# Patient Record
Sex: MALE | Race: WHITE | NOT HISPANIC OR LATINO
[De-identification: names, ages, dates, MRNs, and addresses within clinical notes are randomized per-mention and may not be internally consistent; named-entity substitution may affect disease eponyms.]

---

## 2018-03-28 ENCOUNTER — APPOINTMENT (OUTPATIENT)
Dept: CARDIOLOGY | Facility: CLINIC | Age: 28
End: 2018-03-28
Payer: COMMERCIAL

## 2018-03-28 ENCOUNTER — NON-APPOINTMENT (OUTPATIENT)
Age: 28
End: 2018-03-28

## 2018-03-28 VITALS
WEIGHT: 245 LBS | HEART RATE: 68 BPM | SYSTOLIC BLOOD PRESSURE: 120 MMHG | DIASTOLIC BLOOD PRESSURE: 86 MMHG | OXYGEN SATURATION: 100 % | HEIGHT: 74 IN | BODY MASS INDEX: 31.44 KG/M2

## 2018-03-28 DIAGNOSIS — Z86.39 PERSONAL HISTORY OF OTHER ENDOCRINE, NUTRITIONAL AND METABOLIC DISEASE: ICD-10-CM

## 2018-03-28 DIAGNOSIS — F17.290 NICOTINE DEPENDENCE, OTHER TOBACCO PRODUCT, UNCOMPLICATED: ICD-10-CM

## 2018-03-28 DIAGNOSIS — Z78.9 OTHER SPECIFIED HEALTH STATUS: ICD-10-CM

## 2018-03-28 PROCEDURE — 99244 OFF/OP CNSLTJ NEW/EST MOD 40: CPT

## 2018-03-28 PROCEDURE — 93000 ELECTROCARDIOGRAM COMPLETE: CPT

## 2018-05-14 ENCOUNTER — APPOINTMENT (OUTPATIENT)
Dept: CARDIOLOGY | Facility: CLINIC | Age: 28
End: 2018-05-14
Payer: COMMERCIAL

## 2018-05-14 PROCEDURE — 93306 TTE W/DOPPLER COMPLETE: CPT

## 2018-05-14 PROCEDURE — 93015 CV STRESS TEST SUPVJ I&R: CPT

## 2018-06-01 ENCOUNTER — APPOINTMENT (OUTPATIENT)
Dept: CARDIOLOGY | Facility: CLINIC | Age: 28
End: 2018-06-01
Payer: COMMERCIAL

## 2018-06-01 VITALS
SYSTOLIC BLOOD PRESSURE: 138 MMHG | DIASTOLIC BLOOD PRESSURE: 90 MMHG | BODY MASS INDEX: 29.77 KG/M2 | HEIGHT: 74 IN | WEIGHT: 232 LBS | HEART RATE: 78 BPM

## 2018-06-01 VITALS — DIASTOLIC BLOOD PRESSURE: 75 MMHG | SYSTOLIC BLOOD PRESSURE: 116 MMHG

## 2018-06-01 PROCEDURE — 99214 OFFICE O/P EST MOD 30 MIN: CPT

## 2018-10-09 ENCOUNTER — APPOINTMENT (OUTPATIENT)
Dept: CARDIOLOGY | Facility: CLINIC | Age: 28
End: 2018-10-09
Payer: COMMERCIAL

## 2018-10-09 VITALS
SYSTOLIC BLOOD PRESSURE: 120 MMHG | BODY MASS INDEX: 31.81 KG/M2 | OXYGEN SATURATION: 98 % | HEART RATE: 74 BPM | DIASTOLIC BLOOD PRESSURE: 84 MMHG | HEIGHT: 73 IN | WEIGHT: 240 LBS

## 2018-10-09 PROCEDURE — 99213 OFFICE O/P EST LOW 20 MIN: CPT

## 2019-10-08 ENCOUNTER — APPOINTMENT (OUTPATIENT)
Dept: CARDIOLOGY | Facility: CLINIC | Age: 29
End: 2019-10-08
Payer: COMMERCIAL

## 2019-10-08 ENCOUNTER — NON-APPOINTMENT (OUTPATIENT)
Age: 29
End: 2019-10-08

## 2019-10-08 VITALS
BODY MASS INDEX: 32.34 KG/M2 | WEIGHT: 244 LBS | DIASTOLIC BLOOD PRESSURE: 80 MMHG | OXYGEN SATURATION: 99 % | HEIGHT: 73 IN | SYSTOLIC BLOOD PRESSURE: 122 MMHG | HEART RATE: 72 BPM

## 2019-10-08 PROCEDURE — 99213 OFFICE O/P EST LOW 20 MIN: CPT

## 2019-10-08 PROCEDURE — 93000 ELECTROCARDIOGRAM COMPLETE: CPT

## 2019-10-08 NOTE — REVIEW OF SYSTEMS
[Recent Weight Loss (___ Lbs)] : recent [unfilled] ~Ulb weight loss [see HPI] : see HPI [Joint Pain] : joint pain [Negative] : Heme/Lymph

## 2019-10-08 NOTE — ASSESSMENT
[FreeTextEntry1] : Family history of sudden cardiac death. \par Family history of aortic dilatation. \par Family history of left ventricular hypertrophy. \par Family history of atherosclerosis.\par Overweight\par There is no evidence of significant ischemic heart disease, valvular disease or LV dysfunction\par Prior blood work notable for dyslipidemia with low HDL and elevated fasting glucose\par Coronary calcium score results reviewed. \par Increase aerobic exercise program.\par Weight loss for long-term cardiovascular health.\par \par Unclear if sudden cardiac death related to moderate atherosclerosis\par Will arrange for electrophysiology evaluation, ? genetic screening for SCD\par \par Blood work has been requested. Increase aerobic exercise program. \par

## 2019-10-08 NOTE — PHYSICAL EXAM
[General Appearance - Well Developed] : well developed [Normal Appearance] : normal appearance [Well Groomed] : well groomed [General Appearance - Well Nourished] : well nourished [No Deformities] : no deformities [General Appearance - In No Acute Distress] : no acute distress [Normal Conjunctiva] : the conjunctiva exhibited no abnormalities [Eyelids - No Xanthelasma] : the eyelids demonstrated no xanthelasmas [Normal Oral Mucosa] : normal oral mucosa [No Oral Pallor] : no oral pallor [No Oral Cyanosis] : no oral cyanosis [Normal Jugular Venous A Waves Present] : normal jugular venous A waves present [Normal Jugular Venous V Waves Present] : normal jugular venous V waves present [No Jugular Venous Raygoza A Waves] : no jugular venous raygoza A waves [Respiration, Rhythm And Depth] : normal respiratory rhythm and effort [Exaggerated Use Of Accessory Muscles For Inspiration] : no accessory muscle use [Heart Rate And Rhythm] : heart rate and rhythm were normal [Auscultation Breath Sounds / Voice Sounds] : lungs were clear to auscultation bilaterally [Heart Sounds] : normal S1 and S2 [Murmurs] : no murmurs present [Abdomen Soft] : soft [Abdomen Tenderness] : non-tender [Abdomen Mass (___ Cm)] : no abdominal mass palpated [Abnormal Walk] : normal gait [Gait - Sufficient For Exercise Testing] : the gait was sufficient for exercise testing [Nail Clubbing] : no clubbing of the fingernails [Cyanosis, Localized] : no localized cyanosis [Petechial Hemorrhages (___cm)] : no petechial hemorrhages [Skin Color & Pigmentation] : normal skin color and pigmentation [] : no rash [No Venous Stasis] : no venous stasis [Skin Lesions] : no skin lesions [No Xanthoma] : no  xanthoma was observed [No Skin Ulcers] : no skin ulcer [Oriented To Time, Place, And Person] : oriented to person, place, and time [Affect] : the affect was normal [Mood] : the mood was normal [No Anxiety] : not feeling anxious [FreeTextEntry1] : OVERWEIGHT

## 2019-10-08 NOTE — HISTORY OF PRESENT ILLNESS
[FreeTextEntry1] : MICHELE DO  is a 29 year old  M\par Referred by primary physician. \par +Fhx, DL, overweight\par \par Significant family history of cardiovascular disease. Father  at age of 50 of sudden cardiac death. Grandfather  at similar age no autopsy was performed. Recently father sister  suddenly in the 50s.\par \par No prior cardiovascular evaluation. Baseline physically active. Works at the [employer], swims, work etc. Active with [employer].\par \par There is no prior history of a clinical myocardial infarction, coronary revascularization. \par There is no history of symptomatic congestive heart failure rheumatic heart disease or valvular disease.\par There is no history of symptomatic arrhythmias including atrial fibrillation.\par \par Not engage in any regular aerobic exercise program. \par There is no exertional chest pain, pressure or discomfort. \par There is no significant dyspnea on exertion or orthopnea. \par There are no symptomatic palpitations, lightheadedness, dizziness or syncope.\par \par Electrophysiology visit was rescheduled.\par He is reluctant to take medication. Previously was on Crestor therapy.\par \par EKG today demonstrates normal sinus rhythm. \par Autopsy report reviewed from . Autopsy report of father Michele Do describes hypertensive and arteriosclerotic cardiovascular disease. Moderate atherosclerosis of coronary arteries, slight atherosclerosis of aorta, hypertrophy of heart, aneurysmal dilatation of the ascending aorta, maximal lumen medial narrowing of coronary artery reported as LAD 50%. Right coronary artery 60%. \par \par Autopsy report of aunt Patricia Do [place] 2017. Coronary arteries with moderate atherosclerosis.\par \par Echocardiogram. May 2018. Normal left ventricular function. Minimal valvular disease. \par Exercise tolerance test. May 2018. Completed stage IV of Brandyn protocol. Peak heart rate 169 88% at peak. No chest pain. Normal hemodynamic response. No exercise-induced ischemia by EKG. \par Blood work May 2018. Hemoglobin 14.8, potassium 4.6, creatinine 1.0, HDL 34, , A1c 5.3, C-reactive protein within normal limits. .\par Prior coronary artery calcium score is zero.\par \par

## 2019-12-31 ENCOUNTER — OUTPATIENT (OUTPATIENT)
Dept: OUTPATIENT SERVICES | Facility: HOSPITAL | Age: 29
LOS: 1 days | End: 2019-12-31
Payer: COMMERCIAL

## 2019-12-31 ENCOUNTER — APPOINTMENT (OUTPATIENT)
Dept: MRI IMAGING | Facility: CLINIC | Age: 29
End: 2019-12-31
Payer: COMMERCIAL

## 2019-12-31 DIAGNOSIS — R94.31 ABNORMAL ELECTROCARDIOGRAM [ECG] [EKG]: ICD-10-CM

## 2019-12-31 DIAGNOSIS — Z82.41 FAMILY HISTORY OF SUDDEN CARDIAC DEATH: ICD-10-CM

## 2019-12-31 PROCEDURE — A9585: CPT

## 2019-12-31 PROCEDURE — 75561 CARDIAC MRI FOR MORPH W/DYE: CPT

## 2019-12-31 PROCEDURE — 75561 CARDIAC MRI FOR MORPH W/DYE: CPT | Mod: 26

## 2020-10-05 ENCOUNTER — APPOINTMENT (OUTPATIENT)
Dept: CARDIOLOGY | Facility: CLINIC | Age: 30
End: 2020-10-05
Payer: COMMERCIAL

## 2020-10-23 ENCOUNTER — TRANSCRIPTION ENCOUNTER (OUTPATIENT)
Age: 30
End: 2020-10-23

## 2020-11-16 ENCOUNTER — APPOINTMENT (OUTPATIENT)
Dept: CARDIOLOGY | Facility: CLINIC | Age: 30
End: 2020-11-16
Payer: COMMERCIAL

## 2020-11-16 ENCOUNTER — NON-APPOINTMENT (OUTPATIENT)
Age: 30
End: 2020-11-16

## 2020-11-16 VITALS
SYSTOLIC BLOOD PRESSURE: 138 MMHG | DIASTOLIC BLOOD PRESSURE: 84 MMHG | HEART RATE: 77 BPM | OXYGEN SATURATION: 98 % | BODY MASS INDEX: 32.07 KG/M2 | WEIGHT: 242 LBS | HEIGHT: 73 IN

## 2020-11-16 PROCEDURE — 99213 OFFICE O/P EST LOW 20 MIN: CPT

## 2020-11-16 PROCEDURE — 93000 ELECTROCARDIOGRAM COMPLETE: CPT

## 2020-11-28 NOTE — PHYSICAL EXAM
[General Appearance - Well Developed] : well developed [Normal Appearance] : normal appearance [Well Groomed] : well groomed [General Appearance - Well Nourished] : well nourished [No Deformities] : no deformities [General Appearance - In No Acute Distress] : no acute distress [Normal Conjunctiva] : the conjunctiva exhibited no abnormalities [Eyelids - No Xanthelasma] : the eyelids demonstrated no xanthelasmas [Normal Oral Mucosa] : normal oral mucosa [No Oral Pallor] : no oral pallor [No Oral Cyanosis] : no oral cyanosis [Normal Jugular Venous A Waves Present] : normal jugular venous A waves present [Normal Jugular Venous V Waves Present] : normal jugular venous V waves present [No Jugular Venous Raygoza A Waves] : no jugular venous raygoza A waves [Respiration, Rhythm And Depth] : normal respiratory rhythm and effort [Exaggerated Use Of Accessory Muscles For Inspiration] : no accessory muscle use [Auscultation Breath Sounds / Voice Sounds] : lungs were clear to auscultation bilaterally [Heart Rate And Rhythm] : heart rate and rhythm were normal [Heart Sounds] : normal S1 and S2 [Murmurs] : no murmurs present [Abdomen Soft] : soft [Abdomen Tenderness] : non-tender [Abdomen Mass (___ Cm)] : no abdominal mass palpated [Abnormal Walk] : normal gait [Gait - Sufficient For Exercise Testing] : the gait was sufficient for exercise testing [Nail Clubbing] : no clubbing of the fingernails [Cyanosis, Localized] : no localized cyanosis [Petechial Hemorrhages (___cm)] : no petechial hemorrhages [Skin Color & Pigmentation] : normal skin color and pigmentation [] : no rash [No Venous Stasis] : no venous stasis [Skin Lesions] : no skin lesions [No Skin Ulcers] : no skin ulcer [No Xanthoma] : no  xanthoma was observed [Oriented To Time, Place, And Person] : oriented to person, place, and time [Affect] : the affect was normal [Mood] : the mood was normal [No Anxiety] : not feeling anxious [FreeTextEntry1] : OVERWEIGHT

## 2020-11-28 NOTE — HISTORY OF PRESENT ILLNESS
[FreeTextEntry1] : MICHELE DO  is a 30 year old  M\par \par Referred by primary physician. \par +Fhx, DL, overweight\par \par Significant family history of cardiovascular disease. Father  at age of 50 of sudden cardiac death. Grandfather  at similar age no autopsy was performed. Recently father sister  suddenly in the 50s.\par \par No prior cardiovascular evaluation. Baseline physically active. Works at the [employer], swims, work etc. Active with [employer].\par \par There is no prior history of a clinical myocardial infarction, coronary revascularization. \par There is no history of symptomatic congestive heart failure rheumatic heart disease or valvular disease.\par There is no history of symptomatic arrhythmias including atrial fibrillation.\par \par Not engage in any regular aerobic exercise program. \par There is no exertional chest pain, pressure or discomfort. \par There is no significant dyspnea on exertion or orthopnea. \par There are no symptomatic palpitations, lightheadedness, dizziness or syncope.\par \par he is active with the [employer].  \par Recently his blood pressure is elevated at the [employer] department \par On my examination his blood pressure is 125/92.\par \par Cardiac MRI normal function normal chamber sizes no significant valvular heart disease no regional wall motion abnormality no ischemia scar or fibrosis \par EKG demonstrates sinus rhythm with early repolarization \par \par last blood work 2020 his total cholesterol 201 with an LDL of 133 \par \par Reluctant to take medication. Previously was on Crestor therapy.\par \par Autopsy report reviewed from . Autopsy report of father Michele Do describes hypertensive and arteriosclerotic cardiovascular disease. Moderate atherosclerosis of coronary arteries, slight atherosclerosis of aorta, hypertrophy of heart, aneurysmal dilatation of the ascending aorta, maximal lumen medial narrowing of coronary artery reported as LAD 50%. Right coronary artery 60%. \par \par Autopsy report of aunt Patricia Do [place] 2017. Coronary arteries with moderate atherosclerosis.\par \par Echocardiogram. May 2018. Normal left ventricular function. Minimal valvular disease. \par Exercise tolerance test. May 2018. Completed stage IV of Brandyn protocol. Peak heart rate 169 88% at peak. No chest pain. Normal hemodynamic response. No exercise-induced ischemia by EKG. \par May 2018. Hemoglobin 14.8, potassium 4.6, creatinine 1.0, HDL 34, , A1c 5.3, C-reactive protein within normal limits. .\par Prior coronary artery calcium score is zero.\par

## 2020-11-28 NOTE — ASSESSMENT
[FreeTextEntry1] : Family history of sudden cardiac death. \par Family history of aortic dilatation. \par Family history of left ventricular hypertrophy. \par Family history of atherosclerosis.\par Overweight\par There is no evidence of significant ischemic heart disease, valvular disease or LV dysfunction\par Prior blood work notable for dyslipidemia with low HDL and elevated fasting glucose\par  \par Increase aerobic exercise program.\par Weight loss for long-term cardiovascular health.\par \par Unclear if sudden cardiac death related to moderate atherosclerosis\par \par Outside blood work and EKG have been reviewed. \par Emphasized the importance of blood pressure and lipid  control to reduce long-term cardiovascular risk\par Asked him to monitor his blood pressure at home.  Low-sodium diet.  Weight loss.  Instructed to notify our office if blood pressure is elevated.  \par Discussed follow-up coronary artery calcium score.  \par Blood work including LP(a) has been requested.

## 2022-01-24 ENCOUNTER — NON-APPOINTMENT (OUTPATIENT)
Age: 32
End: 2022-01-24

## 2022-01-24 ENCOUNTER — APPOINTMENT (OUTPATIENT)
Dept: CARDIOLOGY | Facility: CLINIC | Age: 32
End: 2022-01-24
Payer: COMMERCIAL

## 2022-01-24 VITALS
SYSTOLIC BLOOD PRESSURE: 134 MMHG | HEIGHT: 73 IN | OXYGEN SATURATION: 98 % | BODY MASS INDEX: 31.81 KG/M2 | TEMPERATURE: 98 F | HEART RATE: 65 BPM | WEIGHT: 240 LBS | DIASTOLIC BLOOD PRESSURE: 88 MMHG

## 2022-01-24 DIAGNOSIS — Z00.00 ENCOUNTER FOR GENERAL ADULT MEDICAL EXAMINATION W/OUT ABNORMAL FINDINGS: ICD-10-CM

## 2022-01-24 PROCEDURE — 93000 ELECTROCARDIOGRAM COMPLETE: CPT

## 2022-01-24 PROCEDURE — 99214 OFFICE O/P EST MOD 30 MIN: CPT

## 2022-01-24 RX ORDER — SODIUM FLUORIDE 1.1 G/100G
1.1 GEL, DENTIFRICE ORAL
Qty: 51 | Refills: 0 | Status: DISCONTINUED | COMMUNITY
Start: 2020-06-11 | End: 2022-01-24

## 2022-01-25 ENCOUNTER — NON-APPOINTMENT (OUTPATIENT)
Age: 32
End: 2022-01-25

## 2022-01-25 ENCOUNTER — TRANSCRIPTION ENCOUNTER (OUTPATIENT)
Age: 32
End: 2022-01-25

## 2022-02-06 NOTE — HISTORY OF PRESENT ILLNESS
[FreeTextEntry1] : MICHELE DO  is a 31 year old  M\par \par Referred by primary physician. \par +Fhx, DL, overweight\par \par Significant family history of cardiovascular disease. Father  at age of 50 of sudden cardiac death. Grandfather  at similar age no autopsy was performed. Recently father sister  suddenly in the 50s.\par \par No prior cardiovascular evaluation. Baseline physically active. Works at the [employer], swims, work etc. Active with [employer].\par \par There is no prior history of a clinical myocardial infarction, coronary revascularization. \par There is no history of symptomatic congestive heart failure rheumatic heart disease or valvular disease.\par There is no history of symptomatic arrhythmias including atrial fibrillation.\par \par Not engage in any regular aerobic exercise program. \par There is no exertional chest pain, pressure or discomfort. \par There is no significant dyspnea on exertion or orthopnea. \par There are no symptomatic palpitations, lightheadedness, dizziness or syncope.\par \par he is active with the [employer].  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 Previously was on Crestor therapy.\par \par Autopsy report reviewed from . Autopsy report of father Michele Do describes hypertensive and arteriosclerotic cardiovascular disease. Moderate atherosclerosis of coronary arteries, slight atherosclerosis of aorta, hypertrophy of heart, aneurysmal dilatation of the ascending aorta, maximal lumen medial narrowing of coronary artery reported as LAD 50%. Right coronary artery 60%. \par \par Autopsy report of aunt Patricia Do [place] 2017. Coronary arteries with moderate atherosclerosis.\par \par Echocardiogram. May 2018. Normal left ventricular function. Minimal valvular disease. \par Exercise tolerance test. May 2018. Completed stage IV of Brandyn protocol. Peak heart rate 169 88% at peak. No chest pain. Normal hemodynamic response. No exercise-induced ischemia by EKG. \par May 2018. Hemoglobin 14.8, potassium 4.6, creatinine 1.0, HDL 34, , A1c 5.3, C-reactive protein within normal limits. .\par Prior coronary artery calcium score is zero.

## 2022-02-06 NOTE — ASSESSMENT
[FreeTextEntry1] : discussed genetic testing \par follow-up echocardiogram and exercise test to evaluate for hypertensive heart disease \par follow-up blood work \par discussed future consideration of cardiac CTA \par instructed to notify our office if any new symptoms\par \par Family history of sudden cardiac death. \par Family history of aortic dilatation. \par Family history of left ventricular hypertrophy. \par Family history of atherosclerosis.\par Overweight\par There is no evidence of significant ischemic heart disease, valvular disease or LV dysfunction\par Prior blood work notable for dyslipidemia with low HDL and elevated fasting glucose\par  \par Increase aerobic exercise program.\par Weight loss for long-term cardiovascular health.\par \par Unclear if sudden cardiac death related to moderate atherosclerosis\par \par Outside blood work and EKG have been reviewed. \par Emphasized the importance of blood pressure and lipid control to reduce long-term cardiovascular risk\par Asked him to monitor his blood pressure at home.  Low-sodium diet.  Weight loss.  Instructed to notify our office if blood pressure is elevated.  \par Blood work including LP(a) has been requested.

## 2022-02-25 ENCOUNTER — APPOINTMENT (OUTPATIENT)
Dept: CARDIOLOGY | Facility: CLINIC | Age: 32
End: 2022-02-25
Payer: COMMERCIAL

## 2022-02-25 PROCEDURE — 93015 CV STRESS TEST SUPVJ I&R: CPT

## 2022-02-25 PROCEDURE — 93306 TTE W/DOPPLER COMPLETE: CPT

## 2022-03-07 ENCOUNTER — APPOINTMENT (OUTPATIENT)
Dept: CARDIOLOGY | Facility: CLINIC | Age: 32
End: 2022-03-07
Payer: COMMERCIAL

## 2022-03-07 VITALS
DIASTOLIC BLOOD PRESSURE: 82 MMHG | WEIGHT: 245 LBS | SYSTOLIC BLOOD PRESSURE: 126 MMHG | BODY MASS INDEX: 32.47 KG/M2 | HEART RATE: 85 BPM | HEIGHT: 73 IN | OXYGEN SATURATION: 98 % | TEMPERATURE: 97.7 F

## 2022-03-07 DIAGNOSIS — Z82.41 FAMILY HISTORY OF SUDDEN CARDIAC DEATH: ICD-10-CM

## 2022-03-07 PROCEDURE — 99213 OFFICE O/P EST LOW 20 MIN: CPT

## 2022-03-08 NOTE — ASSESSMENT
[FreeTextEntry1] : \par focus on modifiable risk factors due to significant family history\par Discussed referral for cardiac genetics \par long-term I believe he will benefit from lipid-lowering therapy trial of lifestyle measures \par discussed future injectable lipid-lowering therapies \par adjunctive lifestyle measures low-sodium diet regular aerobic exercise \par future consideration of cardiac CTA \par instructed to notify our office if any new symptoms\par \par Family history of sudden cardiac death. \par Family history of aortic dilatation. \par Family history of left ventricular hypertrophy. \par Family history of atherosclerosis.\par Overweight\par There is no evidence of significant ischemic heart disease, valvular disease or LV dysfunction\par Prior blood work notable for dyslipidemia with low HDL \par  \par Increase aerobic exercise program.\par Weight loss for long-term cardiovascular health.\par \par Unclear if sudden cardiac death related to moderate atherosclerosis\par \par Outside blood work and EKG have been reviewed. \par Emphasized the importance of blood pressure and lipid control to reduce long-term cardiovascular risk\par Low-sodium diet.  Weight loss.  Instructed to notify our office if blood pressure is elevated.  \par

## 2022-03-08 NOTE — HISTORY OF PRESENT ILLNESS
[FreeTextEntry1] : MICHELE DO  is a 31 year old  M\par \par Referred by primary physician. \par +Fhx, DL, overweight\par \par Significant family history of cardiovascular disease. Father  at age of 50 of sudden cardiac death. Grandfather  at similar age no autopsy was performed. Recently father sister  suddenly in the 50s.\par \par Baseline physically active. Works at the [employer], active with [employer].\par \par There is no prior history of a clinical myocardial infarction, coronary revascularization. \par There is no history of symptomatic congestive heart failure rheumatic heart disease or valvular disease.\par There is no history of symptomatic arrhythmias including atrial fibrillation.\par \par Not engage in any regular aerobic exercise program. \par There is no exertional chest pain, pressure or discomfort. \par There is no significant dyspnea on exertion or orthopnea. \par There are no symptomatic palpitations, lightheadedness, dizziness or syncope.\par \par Last visit requested noninvasive testing.  Returns today for clinical follow-up.  \par he is reluctant to take statin therapy due to family member having side effects \par \par Blood work 2022 hemoglobin A1c 5.2 hemoglobin 15.2  HDL 35 \par echocardiogram from 2022 is normal left ventricular function no significant valvular heart disease \par exercise tolerance stage IV Brandyn protocol no ischemia normal blood pressure response to exercise \par EKG demonstrates sinus rhythm with early repolarization and nonspecific ST changes\par Cardiac MRI normal function normal chamber sizes no significant valvular heart disease no regional wall motion abnormality no ischemia scar or fibrosis \par \par Autopsy report reviewed from . Autopsy report of father Michele Do describes hypertensive and arteriosclerotic cardiovascular disease. Moderate atherosclerosis of coronary arteries, slight atherosclerosis of aorta, hypertrophy of heart, aneurysmal dilatation of the ascending aorta, maximal lumen medial narrowing of coronary artery reported as LAD 50%. Right coronary artery 60%. \par \par Autopsy report of aunt Patricia Do [place] 2017. Coronary arteries with moderate atherosclerosis.\par \par Prior coronary artery calcium score is zero.\par \par

## 2022-03-24 ENCOUNTER — APPOINTMENT (OUTPATIENT)
Dept: CARDIOLOGY | Facility: CLINIC | Age: 32
End: 2022-03-24
Payer: COMMERCIAL

## 2022-03-24 PROCEDURE — 99205 OFFICE O/P NEW HI 60 MIN: CPT | Mod: 95

## 2022-03-24 NOTE — FAMILY HISTORY
[FreeTextEntry1] : FamilyHistory_20_twCiteListControlStart FamilyHistory_20_twCiteListControlEnd [id number] \par A four-generation family history was constructed and scanned into [employer]. \par Family history is significant for: \par siblings\par 29 yo brother healthy, hx of prior pancreatis, prior obesity\par \par Mother healthy b 1962, \par Maternal aunts x 1, b 1960, possible BR CA: daughter 33 yo, daughter 34 yo healthy grossly \par Maternal uncles none\par Maternal Grandmother dec young prior to patients birth, hx etoh use do\par Maternal Grandfather dec 1 yr ago 90s healthy\par \par Father dec 49 yo\par Paternal aunts x1 , dec 51 yo, daughter 32 yo and , daughter 29 yo med hx unk\par Paternal uncles none\par Paternal Grandfather dec 49 yo no autopsy, possible anuerysm\par Paternal Grandmother dec 80s throat CA  years ago \par \par children:\par twin sons 20M healthy\par daughter expecting\par \par his maternal families originate from [facility] and paternal families originate from [place]. \par No Ashkenazi Taoist ancestry. \par Family history was positive/negative for consanguinity  \par No family history of SIDS\par  \par \par  [FreeTextEntry2] : unk [FreeTextEntry3] : [place].

## 2022-03-24 NOTE — REASON FOR VISIT
[FreeTextEntry3] : Dear            . I saw your patient LEONIE DO on 03/24/2022 . Please see the note below for the assessment and plan. \par \par LEONIE DO  was seen  for an initial consultation at the Cardiogen[facility] Program at [facility] on 03/24/2022.   Mr. DO was referred by Dr. John for hereditary cardiac predisposition risk assessment and counseling, due to( indicate reason for referral.)\par \par

## 2022-03-24 NOTE — PHYSICAL EXAM
[Extraocular Movements Intact] : extraocular movements were intact [Normal] : without anomalies of lips, teeth or palate

## 2022-03-24 NOTE — HISTORY OF PRESENT ILLNESS
[FreeTextEntry1] : LEONIE DO is a 32 yo no sig PMH, \par denies CP, SOB, syncope, palpitations\par \par his family history detailed below is significant for SCD\par \par today he is referred for a cardiogenomic evaluation\par

## 2022-06-22 ENCOUNTER — APPOINTMENT (OUTPATIENT)
Dept: CARDIOLOGY | Facility: CLINIC | Age: 32
End: 2022-06-22

## 2022-06-22 PROCEDURE — 99215 OFFICE O/P EST HI 40 MIN: CPT | Mod: 95

## 2022-07-01 NOTE — REASON FOR VISIT
[FreeTextEntry3] : Dear Dr. John        . I saw your patient LEONIE DO on 06/22/2022 . Please see the note below for the assessment and plan. \par \par LEONIE DO  was seen  for an initial consultation at the Cardiogenomics Program at [facility] on 03/24/2022.   Mr. DO was referred by Dr. John for hereditary cardiac predisposition risk assessment and counseling, due to FH SCD\par \par

## 2022-07-01 NOTE — HISTORY OF PRESENT ILLNESS
[FreeTextEntry1] : LEONIE DO is a 30 yo no sig PMH, \par denies CP, SOB, syncope, palpitations\par \par his family history detailed below is significant for SCD\par \par \par he underwent genetic testing and today presents for results

## 2022-07-01 NOTE — FAMILY HISTORY
[FreeTextEntry1] : FamilyHistory_20_twCiteListControlStart FamilyHistory_20_twCiteListControlEnd [id number] \par A four-generation family history was constructed and scanned into [employer]. \par Family history is significant for: \par siblings\par 27 yo brother healthy, hx of prior pancreatis, prior obesity\par \par Mother healthy b 1962, \par Maternal aunts x 1, b 1960, possible BR CA: daughter 33 yo, daughter 34 yo healthy grossly \par Maternal uncles none\par Maternal Grandmother dec young prior to patients birth, hx etoh use do\par Maternal Grandfather dec 1 yr ago 90s healthy\par \par Father dec 49 yo\par Paternal aunts x1 , dec 51 yo, daughter 30 yo and , daughter 27 yo med hx unk\par Paternal uncles none\par Paternal Grandfather dec 49 yo no autopsy, possible aneurysm\par Paternal Grandmother dec 80s throat CA  years ago \par \par children:\par twin sons 20M healthy\par daughter expecting\par \par his maternal families originate from [facility] and paternal families originate from [place]. \par No Ashkenazi Mosque ancestry. \par Family history was positive/negative for consanguinity  \par No family history of SIDS\par  \par \par  [FreeTextEntry2] : unk [FreeTextEntry3] : [place].

## 2022-09-19 ENCOUNTER — APPOINTMENT (OUTPATIENT)
Dept: CARDIOLOGY | Facility: CLINIC | Age: 32
End: 2022-09-19

## 2024-02-05 ENCOUNTER — NON-APPOINTMENT (OUTPATIENT)
Age: 34
End: 2024-02-05

## 2024-02-05 ENCOUNTER — APPOINTMENT (OUTPATIENT)
Dept: CARDIOLOGY | Facility: CLINIC | Age: 34
End: 2024-02-05
Payer: COMMERCIAL

## 2024-02-05 VITALS
BODY MASS INDEX: 32.47 KG/M2 | OXYGEN SATURATION: 98 % | HEART RATE: 73 BPM | DIASTOLIC BLOOD PRESSURE: 92 MMHG | HEIGHT: 73 IN | SYSTOLIC BLOOD PRESSURE: 124 MMHG | WEIGHT: 245 LBS

## 2024-02-05 DIAGNOSIS — R03.0 ELEVATED BLOOD-PRESSURE READING, W/OUT DIAGNOSIS OF HYPERTENSION: ICD-10-CM

## 2024-02-05 PROCEDURE — 93000 ELECTROCARDIOGRAM COMPLETE: CPT

## 2024-02-05 PROCEDURE — 99214 OFFICE O/P EST MOD 30 MIN: CPT

## 2024-02-06 NOTE — HISTORY OF PRESENT ILLNESS
[FreeTextEntry1] : MICHELE DO  is a 33 year old  M  Referred by primary physician. +Fhx, DL, overweight  Significant family history of cardiovascular disease. Father  at age of 50 of sudden cardiac death. Grandfather  at similar age no autopsy was performed.  father sister  suddenly in the 50s.  Baseline physically active. Works at the [employer], active with [employer].  There is no prior history of a clinical myocardial infarction, coronary revascularization. There is no history of symptomatic congestive heart failure rheumatic heart disease or valvular disease. There is no history of symptomatic arrhythmias including atrial fibrillation.  There is no exertional chest pain, pressure or discomfort. There is no significant dyspnea on exertion or orthopnea. There are no symptomatic palpitations, lightheadedness, dizziness or syncope.  Last seen 2 years ago   He is now working out with a .  His wife reports that he snores and has witnessed apneic episodes.  wife sleeps in separate bedroom. his primary talk with him about starting statin therapy. He is very reluctant to take medical therapy.   EKG has normal sinus rhythm with nonspecific ST changes.  Underwent genetics testing.  Recent [facility] department blood work is a hemoglobin of 14.9 creatinine 1.0 total cholesterol 221 HDL 42   echocardiogram from 2022 is normal left ventricular function no significant valvular heart disease exercise tolerance stage IV Brandyn protocol no ischemia normal blood pressure response to exercise Cardiac MRI normal function normal chamber sizes no significant valvular heart disease no regional wall motion abnormality no ischemia scar or fibrosis  Autopsy report reviewed from . Autopsy report of father Michele Do describes hypertensive and arteriosclerotic cardiovascular disease. Moderate atherosclerosis of coronary arteries, slight atherosclerosis of aorta, hypertrophy of heart, aneurysmal dilatation of the ascending aorta, maximal lumen medial narrowing of coronary artery reported as LAD 50%. Right coronary artery 60%.  Autopsy report of aunt Patricia Do [place] 2017. Coronary arteries with moderate atherosclerosis.  Prior coronary artery calcium score is zero.

## 2024-02-06 NOTE — ASSESSMENT
[FreeTextEntry1] : discussed GLP agonist for weight loss cardiovascular risk modification and event reduction.   EKG and blood work have been reviewed.  Likely sleep apnea. Formal sleep study will be performed. Discussed importance of treatment of sleep apnea to reduce cardiovascular risk.  Discussed lipid-lowering therapy for cardiovascular reduction.  Cardiac CTA will be performed (abnl ekg, fhx hl)  focus on modifiable risk factors due to significant family history Emphasized the importance of blood pressure and lipid control to reduce long-term cardiovascular risk' long-term I believe he will benefit from lipid-lowering therapy after trial of lifestyle measures adjunctive lifestyle measures low-sodium diet  instructed to notify our office if any new symptoms  Family history of sudden cardiac death. Family history of aortic dilatation. Family history of left ventricular hypertrophy. Family history of atherosclerosis. Overweight Prior blood work notable for dyslipidemia with low HDL  Increase aerobic exercise program. Weight loss for long-term cardiovascular health.

## 2024-02-20 ENCOUNTER — OUTPATIENT (OUTPATIENT)
Dept: OUTPATIENT SERVICES | Facility: HOSPITAL | Age: 34
LOS: 1 days | End: 2024-02-20
Payer: COMMERCIAL

## 2024-02-20 DIAGNOSIS — G47.33 OBSTRUCTIVE SLEEP APNEA (ADULT) (PEDIATRIC): ICD-10-CM

## 2024-02-20 PROCEDURE — 95800 SLP STDY UNATTENDED: CPT

## 2024-05-13 ENCOUNTER — APPOINTMENT (OUTPATIENT)
Dept: CARDIOLOGY | Facility: CLINIC | Age: 34
End: 2024-05-13
Payer: COMMERCIAL

## 2024-05-13 VITALS
OXYGEN SATURATION: 99 % | WEIGHT: 250 LBS | HEIGHT: 73 IN | BODY MASS INDEX: 33.13 KG/M2 | SYSTOLIC BLOOD PRESSURE: 128 MMHG | DIASTOLIC BLOOD PRESSURE: 80 MMHG | HEART RATE: 73 BPM

## 2024-05-13 DIAGNOSIS — R94.31 ABNORMAL ELECTROCARDIOGRAM [ECG] [EKG]: ICD-10-CM

## 2024-05-13 DIAGNOSIS — E66.3 OVERWEIGHT: ICD-10-CM

## 2024-05-13 PROCEDURE — 99214 OFFICE O/P EST MOD 30 MIN: CPT

## 2024-05-15 NOTE — HISTORY OF PRESENT ILLNESS
[FreeTextEntry1] : LEONIE DO  is a 33 year old  M  Referred by primary physician. +Fhx, DL, overweight, MIGDALIA  Significant family history of cardiovascular disease. Father  at age of 50 of sudden cardiac death. Grandfather  at similar age no autopsy was performed.  father sister  suddenly in the 50s.  Baseline physically active. Works at the [employer], active with [employer].  There is no prior history of a clinical myocardial infarction, coronary revascularization. There is no history of symptomatic congestive heart failure rheumatic heart disease or valvular disease. There is no history of symptomatic arrhythmias including atrial fibrillation.  There is no exertional chest pain, pressure or discomfort. There is no significant dyspnea on exertion or orthopnea. There are no symptomatic palpitations, lightheadedness, dizziness or syncope.  Last seen 2 years ago   He is now working out with a .  His wife reports that he snores and has witnessed apneic episodes.  wife sleeps in separate bedroom. his primary talk with him about starting statin therapy. He is very reluctant to take medical therapy.  [facility] has established care with Dr. Chicas for txt of MIGDALIA for several weeks has had a mask.   Cardiac CTA 3/2024 zero calcium score normal cors and a L lung cyst EKG has normal sinus rhythm with nonspecific ST changes.  Underwent genetics testing.  Recent [facility] department blood work is a hemoglobin of 14.9 creatinine 1.0 total cholesterol 221 HDL 42   echocardiogram from 2022 is normal left ventricular function no significant valvular heart disease exercise tolerance stage IV Brandyn protocol no ischemia normal blood pressure response to exercise Cardiac MRI normal function normal chamber sizes no significant valvular heart disease no regional wall motion abnormality no ischemia scar or fibrosis  Autopsy report reviewed from . Autopsy report of father Leonie Do describes hypertensive and arteriosclerotic cardiovascular disease. Moderate atherosclerosis of coronary arteries, slight atherosclerosis of aorta, hypertrophy of heart, aneurysmal dilatation of the ascending aorta, maximal lumen medial narrowing of coronary artery reported as LAD 50%. Right coronary artery 60%.  Autopsy report of aunt Patricia Do [place] 2017. Coronary arteries with moderate atherosclerosis.  Prior coronary artery calcium score is zero.

## 2024-05-31 ENCOUNTER — NON-APPOINTMENT (OUTPATIENT)
Age: 34
End: 2024-05-31

## 2024-06-10 ENCOUNTER — APPOINTMENT (OUTPATIENT)
Dept: CARDIOLOGY | Facility: CLINIC | Age: 34
End: 2024-06-10
Payer: COMMERCIAL

## 2024-06-10 VITALS
WEIGHT: 250 LBS | HEART RATE: 80 BPM | OXYGEN SATURATION: 97 % | HEIGHT: 73 IN | SYSTOLIC BLOOD PRESSURE: 126 MMHG | BODY MASS INDEX: 33.13 KG/M2 | DIASTOLIC BLOOD PRESSURE: 80 MMHG

## 2024-06-10 DIAGNOSIS — E66.09 OTHER OBESITY DUE TO EXCESS CALORIES: ICD-10-CM

## 2024-06-10 DIAGNOSIS — E78.2 MIXED HYPERLIPIDEMIA: ICD-10-CM

## 2024-06-10 DIAGNOSIS — R73.01 IMPAIRED FASTING GLUCOSE: ICD-10-CM

## 2024-06-10 PROCEDURE — 99213 OFFICE O/P EST LOW 20 MIN: CPT

## 2024-06-10 RX ORDER — SEMAGLUTIDE 0.5 MG/.5ML
0.5 INJECTION, SOLUTION SUBCUTANEOUS
Qty: 1 | Refills: 0 | Status: ACTIVE | COMMUNITY
Start: 2024-05-13 | End: 1900-01-01

## 2024-06-11 NOTE — HISTORY OF PRESENT ILLNESS
[FreeTextEntry1] : LEONIE DO  is a 33 year old  M  Referred by primary physician. +Fhx, DL, overweight, MIGDALIA  Significant family history of cardiovascular disease. Father  at age of 50 of sudden cardiac death. Grandfather  at similar age no autopsy was performed.  father sister  suddenly in the 50s.  Baseline physically active. Works at the [employer], active with [employer].  There is no prior history of a clinical myocardial infarction, coronary revascularization. There is no history of symptomatic congestive heart failure rheumatic heart disease or valvular disease. There is no history of symptomatic arrhythmias including atrial fibrillation.  There is no exertional chest pain, pressure or discomfort. There is no significant dyspnea on exertion or orthopnea. There are no symptomatic palpitations, lightheadedness, dizziness or syncope.  Last seen 2 years ago   He is now working out with a .  His wife reports that he snores and has witnessed apneic episodes.  wife sleeps in separate bedroom. his primary talk with him about starting statin therapy. He is very reluctant to take medical therapy.  Daniel has established care with Dr. Chicas for txt of MIGDALIA for several weeks has had a mask.   Daniel has started wegovy for 4 weeks on starting dose. No side effects and no weight loss yet.   Cardiac CTA 3/2024 zero calcium score normal cors and a L lung cyst EKG has normal sinus rhythm with nonspecific ST changes.  Underwent genetics testing.  Recent [facility] department blood work is a hemoglobin of 14.9 creatinine 1.0 total cholesterol 221 HDL 42   echocardiogram from 2022 is normal left ventricular function no significant valvular heart disease exercise tolerance stage IV Brandyn protocol no ischemia normal blood pressure response to exercise Cardiac MRI normal function normal chamber sizes no significant valvular heart disease no regional wall motion abnormality no ischemia scar or fibrosis  Autopsy report reviewed from . Autopsy report of father Leonie Do describes hypertensive and arteriosclerotic cardiovascular disease. Moderate atherosclerosis of coronary arteries, slight atherosclerosis of aorta, hypertrophy of heart, aneurysmal dilatation of the ascending aorta, maximal lumen medial narrowing of coronary artery reported as LAD 50%. Right coronary artery 60%.  Autopsy report of aunt Patricia Do [place] 2017. Coronary arteries with moderate atherosclerosis.  Prior coronary artery calcium score is zero.

## 2024-06-11 NOTE — ASSESSMENT
[FreeTextEntry1] : LEONIE DO is a 33 year old M who presents today May 13, 2024 with the above history and the following active issues:   CV risk modification measures Obesity + HLD + MIGDALIA discussed GLP agonist for weight loss cardiovascular risk modification and event reduction.  Unable to lose weight despite at least 6 months of dietary changes and lifestyle modification measures.  The cardiovascular benefits of GLP-1 agonists were discussed as well as the possible side effects, including injection site reaction, upset stomach, nausea, vomiting, diarrhea, and weight loss, as well as dizziness and headaches. Pancreatitis and kidney failure history were reviewed since caution should be taken in patients with a history of either condition. Patient denies a personal or family history of medullary thyroid cancer or MEN2.  Discussed potential GI side effects which will likely subside with time but should call us right away if severe or not resolving on their own. Adjunctive dietary and lifestyle modification measures have been reviewed.  Tolerating 1 month of starting dose. Uptitrate to 0.5mg weekly. Monthly call set to uptitrate further will see him back in 3- 4 months to decide on maintenance therapy as tolerated. Labs prior.   Mod sleep apnea.  Following with pulm now on mask.  Fwd'd CT report to pul regarding noncardiac findings   Cardiac CTA no CAD focus on modifiable risk factors due to significant family history Discussed lipid-lowering therapy for cardiovascular reduction.  Emphasized the importance of blood pressure and lipid control to reduce long-term cardiovascular risk' long-term I believe he will benefit from lipid-lowering therapy after trial of lifestyle measures declines at this time adjunctive lifestyle measures low-sodium diet  instructed to notify our office if any new symptoms  Family history of sudden cardiac death. Family history of aortic dilatation. Family history of left ventricular hypertrophy. Family history of atherosclerosis. CV risk reduction as above  Increase aerobic exercise program.  Sincerely,  MELISSA Villafana Patients history, testing, and plan reviewed with supervising MD: Dr. Zeke John

## 2024-10-18 ENCOUNTER — APPOINTMENT (OUTPATIENT)
Dept: CARDIOLOGY | Facility: CLINIC | Age: 34
End: 2024-10-18
Payer: COMMERCIAL

## 2024-10-18 VITALS
SYSTOLIC BLOOD PRESSURE: 126 MMHG | WEIGHT: 232 LBS | HEART RATE: 84 BPM | OXYGEN SATURATION: 99 % | BODY MASS INDEX: 30.75 KG/M2 | DIASTOLIC BLOOD PRESSURE: 80 MMHG | HEIGHT: 73 IN

## 2024-10-18 DIAGNOSIS — R73.01 IMPAIRED FASTING GLUCOSE: ICD-10-CM

## 2024-10-18 DIAGNOSIS — E78.2 MIXED HYPERLIPIDEMIA: ICD-10-CM

## 2024-10-18 DIAGNOSIS — E66.09 OBESITY, CLASS 1: ICD-10-CM

## 2024-10-18 DIAGNOSIS — E66.811 OBESITY, CLASS 1: ICD-10-CM

## 2024-10-18 PROCEDURE — 99203 OFFICE O/P NEW LOW 30 MIN: CPT

## 2024-10-18 PROCEDURE — 99213 OFFICE O/P EST LOW 20 MIN: CPT

## 2024-11-04 ENCOUNTER — APPOINTMENT (OUTPATIENT)
Dept: CARDIOLOGY | Facility: CLINIC | Age: 34
End: 2024-11-04

## 2024-11-15 ENCOUNTER — NON-APPOINTMENT (OUTPATIENT)
Age: 34
End: 2024-11-15

## 2025-01-27 ENCOUNTER — NON-APPOINTMENT (OUTPATIENT)
Age: 35
End: 2025-01-27

## 2025-01-27 ENCOUNTER — APPOINTMENT (OUTPATIENT)
Dept: CARDIOLOGY | Facility: CLINIC | Age: 35
End: 2025-01-27
Payer: COMMERCIAL

## 2025-01-27 VITALS
OXYGEN SATURATION: 100 % | SYSTOLIC BLOOD PRESSURE: 120 MMHG | BODY MASS INDEX: 29.42 KG/M2 | HEART RATE: 78 BPM | DIASTOLIC BLOOD PRESSURE: 84 MMHG | HEIGHT: 73 IN | WEIGHT: 222 LBS

## 2025-01-27 DIAGNOSIS — E78.2 MIXED HYPERLIPIDEMIA: ICD-10-CM

## 2025-01-27 DIAGNOSIS — E66.811 OBESITY, CLASS 1: ICD-10-CM

## 2025-01-27 DIAGNOSIS — R94.31 ABNORMAL ELECTROCARDIOGRAM [ECG] [EKG]: ICD-10-CM

## 2025-01-27 DIAGNOSIS — R03.0 ELEVATED BLOOD-PRESSURE READING, W/OUT DIAGNOSIS OF HYPERTENSION: ICD-10-CM

## 2025-01-27 DIAGNOSIS — E66.09 OBESITY, CLASS 1: ICD-10-CM

## 2025-01-27 PROCEDURE — 93000 ELECTROCARDIOGRAM COMPLETE: CPT

## 2025-01-27 PROCEDURE — 99214 OFFICE O/P EST MOD 30 MIN: CPT

## 2025-03-10 ENCOUNTER — RX RENEWAL (OUTPATIENT)
Age: 35
End: 2025-03-10

## 2025-07-28 ENCOUNTER — RX RENEWAL (OUTPATIENT)
Age: 35
End: 2025-07-28

## 2025-07-30 ENCOUNTER — APPOINTMENT (OUTPATIENT)
Dept: CARDIOLOGY | Facility: CLINIC | Age: 35
End: 2025-07-30

## 2025-08-01 ENCOUNTER — APPOINTMENT (OUTPATIENT)
Dept: CARDIOLOGY | Facility: CLINIC | Age: 35
End: 2025-08-01
Payer: COMMERCIAL

## 2025-08-01 VITALS
BODY MASS INDEX: 31.15 KG/M2 | DIASTOLIC BLOOD PRESSURE: 86 MMHG | HEIGHT: 72 IN | HEART RATE: 73 BPM | WEIGHT: 230 LBS | SYSTOLIC BLOOD PRESSURE: 126 MMHG | OXYGEN SATURATION: 99 %

## 2025-08-01 DIAGNOSIS — E66.09 OBESITY, CLASS 1: ICD-10-CM

## 2025-08-01 DIAGNOSIS — E78.2 MIXED HYPERLIPIDEMIA: ICD-10-CM

## 2025-08-01 DIAGNOSIS — R03.0 ELEVATED BLOOD-PRESSURE READING, W/OUT DIAGNOSIS OF HYPERTENSION: ICD-10-CM

## 2025-08-01 DIAGNOSIS — E66.811 OBESITY, CLASS 1: ICD-10-CM

## 2025-08-01 DIAGNOSIS — G47.33 OBSTRUCTIVE SLEEP APNEA (ADULT) (PEDIATRIC): ICD-10-CM

## 2025-08-01 DIAGNOSIS — R94.31 ABNORMAL ELECTROCARDIOGRAM [ECG] [EKG]: ICD-10-CM

## 2025-08-01 PROCEDURE — 99214 OFFICE O/P EST MOD 30 MIN: CPT

## 2025-08-08 RX ORDER — TIRZEPATIDE 2.5 MG/.5ML
2.5 INJECTION, SOLUTION SUBCUTANEOUS
Qty: 1 | Refills: 0 | Status: ACTIVE | COMMUNITY
Start: 2025-08-01